# Patient Record
Sex: MALE | Race: WHITE | NOT HISPANIC OR LATINO
[De-identification: names, ages, dates, MRNs, and addresses within clinical notes are randomized per-mention and may not be internally consistent; named-entity substitution may affect disease eponyms.]

---

## 2023-01-24 PROBLEM — Z00.00 ENCOUNTER FOR PREVENTIVE HEALTH EXAMINATION: Status: ACTIVE | Noted: 2023-01-24

## 2023-01-31 ENCOUNTER — APPOINTMENT (OUTPATIENT)
Dept: ORTHOPEDIC SURGERY | Facility: CLINIC | Age: 63
End: 2023-01-31
Payer: COMMERCIAL

## 2023-01-31 VITALS — WEIGHT: 200 LBS | HEIGHT: 72 IN | BODY MASS INDEX: 27.09 KG/M2

## 2023-01-31 DIAGNOSIS — Z78.9 OTHER SPECIFIED HEALTH STATUS: ICD-10-CM

## 2023-01-31 PROCEDURE — 99203 OFFICE O/P NEW LOW 30 MIN: CPT

## 2023-01-31 NOTE — DISCUSSION/SUMMARY
[de-identified] : Discussed the nature of the diagnosis and risk and benefits of different modalities of treatment.\par He will consult with Dr. Burr for possible collagenase treatment. \par PRN

## 2023-01-31 NOTE — HISTORY OF PRESENT ILLNESS
[6] : 6 [4] : 4 [Dull/Aching] : dull/aching [Localized] : localized [Full time] : Work status: full time [de-identified] : 62 year old male presenting with RIGHT hand ring finger inability to extend.  [] : Post Surgical Visit: no [FreeTextEntry1] : R hand [FreeTextEntry3] : N/A Chronic [FreeTextEntry5] : 61 y/O Ampoidex M eval R hand. atraumatic chronic pain. 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

## 2023-01-31 NOTE — PHYSICAL EXAM
[Right] : right hand [Left] : left hand [] : no erythema [FreeTextEntry3] : pretendinous 2,4,5 \par spiral to 5 \par little finger 40 degree pip contracture

## 2023-02-06 ENCOUNTER — APPOINTMENT (OUTPATIENT)
Dept: ORTHOPEDIC SURGERY | Facility: CLINIC | Age: 63
End: 2023-02-06
Payer: COMMERCIAL

## 2023-02-06 VITALS — HEIGHT: 72 IN | WEIGHT: 200 LBS | BODY MASS INDEX: 27.09 KG/M2

## 2023-02-06 PROCEDURE — 99214 OFFICE O/P EST MOD 30 MIN: CPT

## 2023-02-06 RX ORDER — COLLAGENASE CLOSTRIDIUM HISTOLYTICUM 0.9 MG
0.9 KIT INJECTION
Qty: 2 | Refills: 0 | Status: ACTIVE | COMMUNITY
Start: 2023-02-06 | End: 1900-01-01

## 2023-02-06 NOTE — PHYSICAL EXAM
[de-identified] : R hand\par RF PIP 90 deg contracture with palp cord\par SF MCP 60 deg contracture with palp cord\par +table top test\par No triggering

## 2023-02-06 NOTE — HISTORY OF PRESENT ILLNESS
[2] : 2 [1] : 2 [Dull/Aching] : dull/aching [Ice] : ice [de-identified] : R hand \par RF PIP 90 deg\par SF MCP 60 deg \par \par Getting worse\par Difficulty with ADLs [] : no [FreeTextEntry1] : r hand [FreeTextEntry3] : 2 years ago [FreeTextEntry5] : R RF,SF can not bend. no injury [de-identified] : 1/31/23 [de-identified] : Dr. iGlmore

## 2023-03-09 ENCOUNTER — APPOINTMENT (OUTPATIENT)
Dept: ORTHOPEDIC SURGERY | Facility: CLINIC | Age: 63
End: 2023-03-09
Payer: COMMERCIAL

## 2023-03-09 VITALS — HEIGHT: 72 IN | BODY MASS INDEX: 27.09 KG/M2 | WEIGHT: 200 LBS

## 2023-03-09 PROCEDURE — 99214 OFFICE O/P EST MOD 30 MIN: CPT | Mod: 25

## 2023-03-09 PROCEDURE — 20527 NJX NZM PALMAR FASCIAL CORD: CPT | Mod: RT

## 2023-03-09 NOTE — ASSESSMENT
[FreeTextEntry1] : I discussed alternative treatments for Dupuytrens including but not limited to splinting, aponeurotomy, and surgery. They want to proceed with Xiaflex.\par \par Xiaflex was injected into the appropriate cord under aseptic conditions and according to protocol.\par The patient tolerated it well\par Return tomorrow for manipulation

## 2023-03-09 NOTE — PHYSICAL EXAM
[de-identified] : R hand\par RF PIP 90 deg contracture with palp cord\par SF MCP 70 deg contracture with palp cord\par +table top test\par No triggering

## 2023-03-09 NOTE — HISTORY OF PRESENT ILLNESS
[0] : 0 [de-identified] : R Dupuytrens\par Getting worse [FreeTextEntry1] : R hand [de-identified] : N/A

## 2023-03-10 ENCOUNTER — APPOINTMENT (OUTPATIENT)
Dept: ORTHOPEDIC SURGERY | Facility: CLINIC | Age: 63
End: 2023-03-10
Payer: COMMERCIAL

## 2023-03-10 VITALS — HEIGHT: 72 IN | BODY MASS INDEX: 27.09 KG/M2 | WEIGHT: 200 LBS

## 2023-03-10 PROCEDURE — 26341 MANIPULAT PALM CORD POST INJ: CPT | Mod: RT

## 2023-03-10 PROCEDURE — 99213 OFFICE O/P EST LOW 20 MIN: CPT

## 2023-03-10 NOTE — PHYSICAL EXAM
[de-identified] : R hand\par RF PIP 90 deg contracture with palp cord\par SF MCP 70 deg contracture with palp cord\par +table top test\par No triggering

## 2023-03-10 NOTE — ASSESSMENT
[FreeTextEntry1] : Digital block was given under aseptic conditions\par Patient tolerated it well\par \par I manipulated the finger(s) to extension\par OT for night time custom brace\par Light activities and advance as tolerated\par Return in 3 weeks\par

## 2023-03-10 NOTE — HISTORY OF PRESENT ILLNESS
[2] : 2 [Dull/Aching] : dull/aching [4] : 4 [de-identified] : JOSUE Howell [FreeTextEntry1] :  right SF/ RF  [de-identified] : no

## 2023-03-31 ENCOUNTER — APPOINTMENT (OUTPATIENT)
Dept: ORTHOPEDIC SURGERY | Facility: CLINIC | Age: 63
End: 2023-03-31
Payer: COMMERCIAL

## 2023-03-31 VITALS — WEIGHT: 200 LBS | BODY MASS INDEX: 27.09 KG/M2 | HEIGHT: 72 IN

## 2023-03-31 PROCEDURE — 99213 OFFICE O/P EST LOW 20 MIN: CPT

## 2023-03-31 NOTE — PHYSICAL EXAM
[de-identified] : R hand\par SF and RF contractures of 10 deg\par NVI\par Nontender\par Mild swelling

## 2023-03-31 NOTE — HISTORY OF PRESENT ILLNESS
[3] : 3 [1] : 2 [Dull/Aching] : dull/aching [de-identified] : R SF RF Xiaflex\par Still has some contracture\par But much better  [FreeTextEntry1] : R hand/ KADE/ RF [FreeTextEntry5] : pain is better\par  [de-identified] : xiaflex inj last visit

## 2023-11-14 ENCOUNTER — APPOINTMENT (OUTPATIENT)
Dept: ORTHOPEDIC SURGERY | Facility: CLINIC | Age: 63
End: 2023-11-14
Payer: COMMERCIAL

## 2023-11-14 VITALS — WEIGHT: 205 LBS | HEIGHT: 72 IN | BODY MASS INDEX: 27.77 KG/M2

## 2023-11-14 DIAGNOSIS — Z85.46 PERSONAL HISTORY OF MALIGNANT NEOPLASM OF PROSTATE: ICD-10-CM

## 2023-11-14 DIAGNOSIS — M25.512 PAIN IN LEFT SHOULDER: ICD-10-CM

## 2023-11-14 DIAGNOSIS — M19.012 PRIMARY OSTEOARTHRITIS, LEFT SHOULDER: ICD-10-CM

## 2023-11-14 DIAGNOSIS — M75.42 IMPINGEMENT SYNDROME OF LEFT SHOULDER: ICD-10-CM

## 2023-11-14 PROCEDURE — 99203 OFFICE O/P NEW LOW 30 MIN: CPT | Mod: 25

## 2023-11-14 PROCEDURE — 73030 X-RAY EXAM OF SHOULDER: CPT | Mod: LT

## 2023-11-14 PROCEDURE — 20610 DRAIN/INJ JOINT/BURSA W/O US: CPT | Mod: LT

## 2023-12-15 ENCOUNTER — APPOINTMENT (OUTPATIENT)
Dept: ORTHOPEDIC SURGERY | Facility: CLINIC | Age: 63
End: 2023-12-15
Payer: COMMERCIAL

## 2023-12-15 VITALS — HEIGHT: 72 IN | WEIGHT: 205 LBS | BODY MASS INDEX: 27.77 KG/M2

## 2023-12-15 PROCEDURE — 99213 OFFICE O/P EST LOW 20 MIN: CPT

## 2023-12-15 RX ORDER — COLLAGENASE CLOSTRIDIUM HISTOLYTICUM 0.9 MG
0.9 KIT INJECTION
Qty: 3 | Refills: 0 | Status: ACTIVE | COMMUNITY
Start: 2023-12-15 | End: 1900-01-01

## 2023-12-15 NOTE — HISTORY OF PRESENT ILLNESS
[3] : 3 [1] : 2 [Dull/Aching] : dull/aching [de-identified] : R SF and RF contractures getting worse Had Xiaflex which helped- no recurring  [FreeTextEntry1] : R hand/ KADE/ RF [FreeTextEntry5] : pain is better\par   [de-identified] : ot brace

## 2023-12-15 NOTE — PHYSICAL EXAM
[de-identified] : R hand SF PIP contracture 60 deg with palp cord RF PIP contracture 60 deg with palp cord +table top test Nontender  L SF 30 deg PIP contracture with palp cord +table top test Nontender

## 2024-02-26 RX ORDER — COLLAGENASE CLOSTRIDIUM HISTOLYTICUM 0.9 MG
0.9 KIT INJECTION
Qty: 1 | Refills: 0 | Status: ACTIVE | COMMUNITY
Start: 2024-02-26 | End: 1900-01-01

## 2024-04-11 ENCOUNTER — APPOINTMENT (OUTPATIENT)
Dept: ORTHOPEDIC SURGERY | Facility: CLINIC | Age: 64
End: 2024-04-11

## 2024-04-12 ENCOUNTER — APPOINTMENT (OUTPATIENT)
Dept: ORTHOPEDIC SURGERY | Facility: CLINIC | Age: 64
End: 2024-04-12

## 2024-06-13 ENCOUNTER — APPOINTMENT (OUTPATIENT)
Dept: ORTHOPEDIC SURGERY | Facility: CLINIC | Age: 64
End: 2024-06-13
Payer: COMMERCIAL

## 2024-06-13 VITALS — BODY MASS INDEX: 27.77 KG/M2 | WEIGHT: 205 LBS | HEIGHT: 72 IN

## 2024-06-13 PROCEDURE — 20527 NJX NZM PALMAR FASCIAL CORD: CPT | Mod: RT

## 2024-06-13 PROCEDURE — 99214 OFFICE O/P EST MOD 30 MIN: CPT | Mod: 25

## 2024-06-13 NOTE — PHYSICAL EXAM
[de-identified] : R hand SF PIP contracture 90 deg with palp cord RF PIP contracture 90 deg with palp cord +table top test Nontender  L SF 30 deg PIP contracture with palp cord +table top test Nontender

## 2024-06-13 NOTE — HISTORY OF PRESENT ILLNESS
[3] : 3 [2] : 2 [Dull/Aching] : dull/aching [de-identified] : R RF and SF contractures Getting worse [FreeTextEntry1] : R SF/ RF

## 2024-06-13 NOTE — ASSESSMENT
[FreeTextEntry1] : I discussed alternative treatments for Dupuytrens including but not limited to splinting, aponeurotomy, and surgery. They want to proceed with Xiaflex.   R RF and SF Xiaflex injection  Xiaflex was injected into the appropriate cord under aseptic conditions and according to protocol. 58 units administered; 32 units discarded per vial used  The patient tolerated it well Return tomorrow for manipulation

## 2024-06-14 ENCOUNTER — APPOINTMENT (OUTPATIENT)
Dept: ORTHOPEDIC SURGERY | Facility: CLINIC | Age: 64
End: 2024-06-14
Payer: COMMERCIAL

## 2024-06-14 VITALS — BODY MASS INDEX: 27.77 KG/M2 | WEIGHT: 205 LBS | HEIGHT: 72 IN

## 2024-06-14 DIAGNOSIS — M72.0 PALMAR FASCIAL FIBROMATOSIS [DUPUYTREN]: ICD-10-CM

## 2024-06-14 PROCEDURE — 99213 OFFICE O/P EST LOW 20 MIN: CPT

## 2024-06-14 PROCEDURE — 26341 MANIPULAT PALM CORD POST INJ: CPT | Mod: RT

## 2024-06-14 NOTE — ASSESSMENT
[FreeTextEntry1] : Digital block was given under aseptic conditions Patient tolerated it well  I manipulated the finger(s) to extension OT for night time custom brace Light activities and advance as tolerated Return in 3 weeks  I discussed Xiaflex for L SF contracture Will schedule that in the future

## 2024-06-14 NOTE — HISTORY OF PRESENT ILLNESS
[4] : 4 [Dull/Aching] : dull/aching [de-identified] : JOSUE Howell yesterday  Here for manipulation   He has L hand contractures that are getting worse [FreeTextEntry1] : JOSUE rocha,rf [de-identified] : splint

## 2024-06-14 NOTE — PHYSICAL EXAM
[de-identified] : R hand SF PIP contracture 90 deg with palp cord RF PIP contracture 90 deg with palp cord +table top test Nontender  L SF 30 deg PIP contracture with palp cord +table top test Nontender

## 2024-07-26 ENCOUNTER — APPOINTMENT (OUTPATIENT)
Dept: ORTHOPEDIC SURGERY | Facility: CLINIC | Age: 64
End: 2024-07-26
Payer: COMMERCIAL

## 2024-07-26 VITALS — WEIGHT: 205 LBS | HEIGHT: 72 IN | BODY MASS INDEX: 27.77 KG/M2

## 2024-07-26 DIAGNOSIS — M72.0 PALMAR FASCIAL FIBROMATOSIS [DUPUYTREN]: ICD-10-CM

## 2024-07-26 PROCEDURE — 99213 OFFICE O/P EST LOW 20 MIN: CPT

## 2024-07-26 NOTE — ASSESSMENT
[FreeTextEntry1] : I disucssed repeat R hand injections I disucssed L SF injection- will hold off until fall Return prn

## 2024-07-26 NOTE — PHYSICAL EXAM
[de-identified] : R hand SF MCP contracture 30 deg with palp cord RF PIP contracture 45 deg with palp cord +table top test Nontender  L SF 30 deg PIP contracture with palp cord +table top test Nontender

## 2024-07-26 NOTE — HISTORY OF PRESENT ILLNESS
[de-identified] : R RF and SF Xiaflex 3 weeks He is much better  Still has ssome contracture  [1] : 2 [0] : 0 [Dull/Aching] : dull/aching [FreeTextEntry1] : hands [de-identified] : therapy

## 2025-03-14 ENCOUNTER — APPOINTMENT (OUTPATIENT)
Dept: ORTHOPEDIC SURGERY | Facility: CLINIC | Age: 65
End: 2025-03-14
Payer: COMMERCIAL

## 2025-03-14 VITALS — HEIGHT: 72 IN | BODY MASS INDEX: 27.09 KG/M2 | WEIGHT: 200 LBS

## 2025-03-14 DIAGNOSIS — M72.0 PALMAR FASCIAL FIBROMATOSIS [DUPUYTREN]: ICD-10-CM

## 2025-03-14 PROCEDURE — 99213 OFFICE O/P EST LOW 20 MIN: CPT

## 2025-04-10 ENCOUNTER — APPOINTMENT (OUTPATIENT)
Dept: ORTHOPEDIC SURGERY | Facility: CLINIC | Age: 65
End: 2025-04-10
Payer: COMMERCIAL

## 2025-04-10 VITALS — WEIGHT: 200 LBS | BODY MASS INDEX: 27.09 KG/M2 | HEIGHT: 72 IN

## 2025-04-10 PROCEDURE — 99213 OFFICE O/P EST LOW 20 MIN: CPT | Mod: 25

## 2025-04-10 PROCEDURE — 20527 NJX NZM PALMAR FASCIAL CORD: CPT | Mod: RT

## 2025-04-11 ENCOUNTER — APPOINTMENT (OUTPATIENT)
Dept: ORTHOPEDIC SURGERY | Facility: CLINIC | Age: 65
End: 2025-04-11
Payer: COMMERCIAL

## 2025-04-11 VITALS — WEIGHT: 200 LBS | HEIGHT: 72 IN | BODY MASS INDEX: 27.09 KG/M2

## 2025-04-11 DIAGNOSIS — M72.0 PALMAR FASCIAL FIBROMATOSIS [DUPUYTREN]: ICD-10-CM

## 2025-04-11 PROCEDURE — 26341 MANIPULAT PALM CORD POST INJ: CPT | Mod: RT

## 2025-04-11 PROCEDURE — 99213 OFFICE O/P EST LOW 20 MIN: CPT | Mod: 25
